# Patient Record
Sex: MALE | Race: BLACK OR AFRICAN AMERICAN | NOT HISPANIC OR LATINO | URBAN - METROPOLITAN AREA
[De-identification: names, ages, dates, MRNs, and addresses within clinical notes are randomized per-mention and may not be internally consistent; named-entity substitution may affect disease eponyms.]

---

## 2017-01-19 ENCOUNTER — EMERGENCY (EMERGENCY)
Facility: HOSPITAL | Age: 32
LOS: 1 days | Discharge: HOME | End: 2017-01-19
Attending: EMERGENCY MEDICINE | Admitting: EMERGENCY MEDICINE
Payer: COMMERCIAL

## 2017-01-19 VITALS
HEART RATE: 55 BPM | SYSTOLIC BLOOD PRESSURE: 132 MMHG | RESPIRATION RATE: 18 BRPM | DIASTOLIC BLOOD PRESSURE: 71 MMHG | OXYGEN SATURATION: 99 % | TEMPERATURE: 99 F

## 2017-01-19 VITALS
OXYGEN SATURATION: 100 % | SYSTOLIC BLOOD PRESSURE: 120 MMHG | DIASTOLIC BLOOD PRESSURE: 76 MMHG | HEART RATE: 55 BPM | TEMPERATURE: 98 F | RESPIRATION RATE: 16 BRPM

## 2017-01-19 DIAGNOSIS — L02.415 CUTANEOUS ABSCESS OF RIGHT LOWER LIMB: ICD-10-CM

## 2017-01-19 LAB — HIV 1 & 2 AB SERPL IA.RAPID: SIGNIFICANT CHANGE UP

## 2017-01-19 PROCEDURE — 76881 US COMPL JOINT R-T W/IMG: CPT | Mod: 26,RT

## 2017-01-19 PROCEDURE — 99284 EMERGENCY DEPT VISIT MOD MDM: CPT | Mod: 25

## 2017-01-19 PROCEDURE — 10061 I&D ABSCESS COMP/MULTIPLE: CPT

## 2017-01-19 PROCEDURE — 76882 US LMTD JT/FCL EVL NVASC XTR: CPT | Mod: 26,RT

## 2017-01-19 RX ADMIN — Medication 100 MILLIGRAM(S): at 23:55

## 2017-01-19 NOTE — ED PROVIDER NOTE - PHYSICAL EXAMINATION
fluctuant 4cm diameter mass in R groin, with overlying erythema and tenderness. fluctuant 4cm diameter mass in R groin, with overlying erythema and tenderness.    ATTENDING MD Annette: GEN: mild distress from pain, nontoic  CV: RRR  LUNGS: CTA  ABD: soft, notnender  : uncircumcised penis, no lesions, no distcharge, normal testicles. R-medial/subinguinal thigh with 4x2cm fluctuant mass with groove sign int he middle, feels like 2 discreet papable masses with fluctuance, ?abscess vs. buboe  SKIN: otherwise unremarkable

## 2017-01-19 NOTE — ED ADULT NURSE NOTE - OBJECTIVE STATEMENT
32 year old male presented to the ED complaining of a bump on the inside of his right upper thigh near the groin. Pt states it began a few weeks ago, has gotten larger and more painful. Pt went to UC 4 days ago and was sent home with topical antibiotic ointment. Pt denies fever, chills, NVD, denies any drainage from site.

## 2017-01-19 NOTE — ED PROVIDER NOTE - ATTENDING CONTRIBUTION TO CARE
ATTENDING MD Annette: I have seen and evaluated this patient with the advance practice clinician.  I have discussed the history, exam ,and aspects of care with the ACP.  I have reviewed the ACP's note. I agree with the findings  unless other wise stated in the HPI, PE, MDM, and progress notes.

## 2017-01-19 NOTE — ED PROCEDURE NOTE - DETAILS:
ATTENDING, MD Annette:  I, Mingo Bryant, was available in the Emergency Department throughout the entire procedure and I was present during the key portions. I agree with the procedure as documented.
ATTENDING, MD Annette:  I, Mingo Bryant, was available in the Emergency Department throughout the entire procedure and I was present during the key portions. I agree with the procedure as documented.

## 2017-01-19 NOTE — ED PROCEDURE NOTE - PROCEDURE ADDITIONAL DETAILS
2 discrete fluid collections visualized overal lsize 2.cm long. the larger collection is 1.5cm, the smaller is 0.95 cm. THere is no color flow. The fluid is simple. Overall appearance is most suggestive to me of buboe, but sent to radiology for comprehensive confirmatory study.

## 2017-01-19 NOTE — ED PROVIDER NOTE - CARE PLAN
Principal Discharge DX:	Abscess  Instructions for follow-up, activity and diet:	1. Take doxycycline 1 tablet twice daily for 10 days   2. Take ibuprofen and tylenol as directed as needed for pain   3. Follow-up here, or with your primary care physician within 2 days for a wound check.   4. Return to the ER for any new or worsening symptoms.

## 2017-01-19 NOTE — ED PROCEDURE NOTE - CPROC ED INFORMED CONSENT1
Benefits, risks, and possible complications of procedure explained to patient/caregiver who verbalized understanding and gave verbal consent.
Benefits, risks, and possible complications of procedure explained to patient/caregiver who verbalized understanding and gave verbal consent.

## 2017-01-19 NOTE — ED PROVIDER NOTE - PROGRESS NOTE DETAILS
us positive for groin abscess. I&D performed 5cc pus drained from area. will d/c with doxycyline and advise to have packing removed after 2 days and have wound check. -Katia Verdin PA-C us more suggestive for cutaneous abscess. I&D performed 5cc pus drained from area. will d/c with doxycyline and advise to have packing removed after 2 days and have wound check. -Katia Verdin PA-C

## 2017-01-19 NOTE — ED PROVIDER NOTE - PLAN OF CARE
1. Take doxycycline 1 tablet twice daily for 10 days   2. Take ibuprofen and tylenol as directed as needed for pain   3. Follow-up here, or with your primary care physician within 2 days for a wound check.   4. Return to the ER for any new or worsening symptoms.

## 2017-01-19 NOTE — ED PROVIDER NOTE - MEDICAL DECISION MAKING DETAILS
33 y/o M no pmhx presenting with R groin fluctuant mass, varying in size for 1 week, reportedly larger and more tender today causing him to come in. PE remarkable for tender, 4cm fluctuant mass in area. Pt denies any promiscuity outside of his marriage. Denies any fever or chills. Will obtain bloodwork and us of area to determine abscess vs gil and reassess. 31 y/o M no pmhx presenting with R groin fluctuant mass, varying in size for 1 week, reportedly larger and more tender today causing him to come in. PE remarkable for tender, 4cm fluctuant mass in area. Pt denies any promiscuity outside of his marriage. Denies any fever or chills. Will obtain bloodwork and us of area to determine abscess vs buboe and reassess.    ATTENDING MD Annette: Pt with groin swelling, +groove sign, but atypical location for LGA. will get US to determine if abscess (which should be I&Ded) vs. buboe (which is not). Will start doxycycline for coverage of both. Pt requesting rapid HIV and sypphilis. Will send gen-probe for chlamydia.

## 2017-01-19 NOTE — ED PROVIDER NOTE - OBJECTIVE STATEMENT
33 y/o M no pmhx presenting with swelling and pain to R groin, coming and going for 1 week. Reports initially noted a small, round area of swelling, for which he went to urgent care for evaluation. He was told it was 'likely a lymph node and to watch it.' He reports that today it became significantly larger and more painful so he came in. He denies any fevers, chills. Denies ever having anything like this before. Reports he is sexually active with one partner in his wife and denies any sexual activity outside of his marriage. Denies any history of STD.

## 2017-01-20 LAB
C TRACH RRNA SPEC QL NAA+PROBE: SIGNIFICANT CHANGE UP
GC AMPLIFICATION INTERPRETATION: SIGNIFICANT CHANGE UP
N GONORRHOEA RRNA SPEC QL NAA+PROBE: SIGNIFICANT CHANGE UP
SPECIMEN SOURCE: SIGNIFICANT CHANGE UP
SPECIMEN SOURCE: SIGNIFICANT CHANGE UP
T PALLIDUM AB TITR SER: NEGATIVE — SIGNIFICANT CHANGE UP

## 2017-01-22 LAB
CULTURE RESULTS: NO GROWTH — SIGNIFICANT CHANGE UP
SPECIMEN SOURCE: SIGNIFICANT CHANGE UP

## 2017-04-13 PROCEDURE — 76882 US LMTD JT/FCL EVL NVASC XTR: CPT

## 2017-04-13 PROCEDURE — 87591 N.GONORRHOEAE DNA AMP PROB: CPT

## 2017-04-13 PROCEDURE — 10061 I&D ABSCESS COMP/MULTIPLE: CPT

## 2017-04-13 PROCEDURE — 86703 HIV-1/HIV-2 1 RESULT ANTBDY: CPT

## 2017-04-13 PROCEDURE — 87070 CULTURE OTHR SPECIMN AEROBIC: CPT

## 2017-04-13 PROCEDURE — 86780 TREPONEMA PALLIDUM: CPT

## 2017-04-13 PROCEDURE — 76881 US COMPL JOINT R-T W/IMG: CPT

## 2017-04-13 PROCEDURE — 87491 CHLMYD TRACH DNA AMP PROBE: CPT

## 2017-04-13 PROCEDURE — 99284 EMERGENCY DEPT VISIT MOD MDM: CPT | Mod: 25

## 2017-10-30 NOTE — ED PROCEDURE NOTE - CPROC ED ANATOMIC LOCATION1
Medication refill received from patient     Adderall XR 30 mg    LOV: 9/8/17  NOV: none  Last Labs: none  Last refilled: 10/8/17.  Due 11/7/17    Please advise.      left

## 2018-07-06 ENCOUNTER — EMERGENCY (EMERGENCY)
Facility: HOSPITAL | Age: 33
LOS: 1 days | Discharge: HOME | End: 2018-07-06
Attending: EMERGENCY MEDICINE
Payer: COMMERCIAL

## 2018-07-06 VITALS
SYSTOLIC BLOOD PRESSURE: 129 MMHG | DIASTOLIC BLOOD PRESSURE: 71 MMHG | OXYGEN SATURATION: 100 % | WEIGHT: 160.06 LBS | TEMPERATURE: 98 F | HEART RATE: 63 BPM | RESPIRATION RATE: 18 BRPM

## 2018-07-06 PROCEDURE — 99283 EMERGENCY DEPT VISIT LOW MDM: CPT

## 2018-07-06 RX ORDER — LIDOCAINE 4 G/100G
1 CREAM TOPICAL ONCE
Qty: 0 | Refills: 0 | Status: COMPLETED | OUTPATIENT
Start: 2018-07-06 | End: 2018-07-06

## 2018-07-06 RX ORDER — IBUPROFEN 200 MG
600 TABLET ORAL ONCE
Qty: 0 | Refills: 0 | Status: COMPLETED | OUTPATIENT
Start: 2018-07-06 | End: 2018-07-06

## 2018-07-06 RX ORDER — ACETAMINOPHEN 500 MG
975 TABLET ORAL ONCE
Qty: 0 | Refills: 0 | Status: COMPLETED | OUTPATIENT
Start: 2018-07-06 | End: 2018-07-06

## 2018-07-06 RX ADMIN — Medication 600 MILLIGRAM(S): at 21:29

## 2018-07-06 RX ADMIN — LIDOCAINE 1 PATCH: 4 CREAM TOPICAL at 21:30

## 2018-07-06 RX ADMIN — Medication 975 MILLIGRAM(S): at 21:30
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
## 2018-07-06 NOTE — ED PROVIDER NOTE - CONSTITUTIONAL, MLM
normal... Well appearing, well nourished, awake, alert, oriented to person, place, time/situation and in no apparent distress. Able to walk into ED

## 2018-07-06 NOTE — ED PROVIDER NOTE - ATTENDING CONTRIBUTION TO CARE
Michaela Fsih MD - Attending Physician: I have personally seen and examined this patient with the resident/fellow.  I have fully participated in the care of this patient. I have reviewed all pertinent clinical information, including history, physical exam, plan and the Resident/Fellow’s note and agree except as noted. See MDM

## 2018-07-06 NOTE — ED ADULT NURSE NOTE - CHPI ED SYMPTOMS NEG
no headache/no fussiness/no disorientation/no crying/no decreased eating/drinking/no difficulty bearing weight/no bruising/no laceration/no dizziness
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
## 2018-07-06 NOTE — ED PROVIDER NOTE - MUSCULOSKELETAL, MLM
Limited ROm of R shoulder w/ abduction 2/2 to pain. Tenderness over R superior trapezius muscle. No AC tenderness. Full ROM of neck. No midline spinal tendenress. Paraspinal lumbar tenderness b/l.

## 2018-07-06 NOTE — ED PROVIDER NOTE - CARE PLAN
Principal Discharge DX:	Acute bilateral low back pain without sciatica  Assessment and plan of treatment:	1) Please return to the ED should you have any new or worsening symptoms, worsening pain, develop difficulty walking or any concerning symptoms  2) Please follow up with Please follow up with Orthopedics in 3-5 days. A list of local providers has been provided to you.   3) Please take Motrin (Ibuprofen) 600mg by mouth every 6 hours as needed for pain. Please take this medication with food.   4) Please take Tylenol (Acetaminophen) 650 mg every 4-6 hours as needed for pain. Please do not exceed more than 4,000mg of Tylenol in a day   5) Please remove lidoderm patch 12 hours after placement

## 2018-07-06 NOTE — ED PROVIDER NOTE - PLAN OF CARE
1) Please return to the ED should you have any new or worsening symptoms, worsening pain, develop difficulty walking or any concerning symptoms  2) Please follow up with Please follow up with Orthopedics in 3-5 days. A list of local providers has been provided to you.   3) Please take Motrin (Ibuprofen) 600mg by mouth every 6 hours as needed for pain. Please take this medication with food.   4) Please take Tylenol (Acetaminophen) 650 mg every 4-6 hours as needed for pain. Please do not exceed more than 4,000mg of Tylenol in a day   5) Please remove lidoderm patch 12 hours after placement

## 2019-03-21 NOTE — ED PROVIDER NOTE - CONSTITUTIONAL, MLM
"Encounter Date: 3/21/2019    SCRIBE #1 NOTE: I, Faizan Barnes, am scribing for, and in the presence of,  Yemi Fragoso MD. I have scribed the following portions of the note - the EKG reading. Other sections scribed: ROS, PE.       History     Chief Complaint   Patient presents with    Chest Pain     stabbing chest pain since last night, on and off today      37-year-old male presents with chest pain. Patient reports left-sided chest pain began last night after eating a [employer] cheese steak.  It was described as stabbing. It was constant throughout the night until some relief this morning with an antacid.  Approximately 2 hrs PTA, the pain returned.  Pain has been constant since that time. Left chest, no radiation. Currently "sticking" in quality. No shortness of breath, N/D, diaphoresis.  Patient has been complaining of a cough for the past 3-4 days, productive of green mucus. No history of tobacco abuse. No family history of early CAD. No medical problems.  No immobilization or recent travel.  No history of VTE.          Review of patient's allergies indicates:  No Known Allergies  History reviewed. No pertinent past medical history.  Past Surgical History:   Procedure Laterality Date    NONE N/A 11/8/2014     Family History   Problem Relation Age of Onset    Cancer Neg Hx     Diabetes Neg Hx     Heart disease Neg Hx     Hypertension Neg Hx     Stroke Neg Hx      Social History     Tobacco Use    Smoking status: Never Smoker    Smokeless tobacco: Never Used   Substance Use Topics    Alcohol use: No    Drug use: No     Review of Systems   Constitutional: Negative for chills and fever.   HENT: Negative for sore throat.    Respiratory: Positive for cough. Negative for shortness of breath.    Cardiovascular: Positive for chest pain.   Gastrointestinal: Negative for abdominal pain.   Genitourinary: Negative for frequency.   Musculoskeletal: Negative for myalgias.   Skin: Negative for rash.   Neurological: " Negative for syncope and weakness.   Hematological: Does not bruise/bleed easily.       Physical Exam     Initial Vitals [03/21/19 1811]   BP Pulse Resp Temp SpO2   (!) 142/76 99 18 98.1 °F (36.7 °C) 98 %      MAP       --         Physical Exam    Nursing note and vitals reviewed.  Constitutional: He appears well-developed and well-nourished. He is not diaphoretic. No distress.   HENT:   Head: Normocephalic and atraumatic.   Eyes: Conjunctivae and EOM are normal.   Neck: Normal range of motion. Neck supple. No JVD present.   Cardiovascular: Regular rhythm and intact distal pulses. Exam reveals no gallop and no friction rub.    No murmur heard.  Tachycardic heart sounds.   Pulmonary/Chest: Breath sounds normal. No respiratory distress. He has no wheezes. He has no rhonchi. He has no rales. He exhibits no tenderness.   No cough noted.   Abdominal: Soft. Bowel sounds are normal. He exhibits no distension and no mass. There is no tenderness. There is no rebound and no guarding.   Musculoskeletal: Normal range of motion. He exhibits no tenderness.   Lymphadenopathy:     He has no cervical adenopathy.   Neurological: He is alert and oriented to person, place, and time. He has normal strength. No sensory deficit.   Skin: Skin is warm and dry. Capillary refill takes less than 2 seconds.   Psychiatric: He has a normal mood and affect.         ED Course   Procedures  Labs Reviewed   COMPREHENSIVE METABOLIC PANEL - Abnormal; Notable for the following components:       Result Value    Glucose 126 (*)     All other components within normal limits    Narrative:     ONE LAVENDER SHARED   CBC W/ AUTO DIFFERENTIAL    Narrative:     ONE LAVENDER SHARED   TROPONIN I    Narrative:     ONE LAVENDER SHARED   D DIMER, QUANTITATIVE    Narrative:     ONE LAVENDER SHARED   B-TYPE NATRIURETIC PEPTIDE   TROPONIN I     EKG Readings: (Independently Interpreted)   Sinus tachycardia at 109 bpm, normal axis, normal ST segments, T wave inversions in  lead 3 and AVF.       Imaging Results          X-Ray Chest PA And Lateral (In process)               X-Rays:   Independently Interpreted Readings:   Chest X-Ray: Normal heart size.  No infiltrates.  No acute abnormalities.     Medical Decision Making:   History:   Old Medical Records: I decided to obtain old medical records.  Initial Assessment:   37-year-old male presents with chest pain.  Differential Diagnosis:   This patient's differential diagnosis includes, but is not limited to: acute myocardial infarction (AMI), acute coronary syndrome, pneumothorax, pleurisy, pneumonia,costochondritis, pulmonary embolus, muscular pain, reflux, diaphragmatic irritation.      Clinical Tests:   Lab Tests: Ordered and Reviewed  Radiological Study: Reviewed and Ordered  Medical Tests: Reviewed and Ordered  ED Management:  Pt's well appearance and reassuring hemodynamics are inconsistent with aortic dissection.  Given the concurrence of the symptoms with a meal, and improvement with an antacid, they are overall concerning for gastritis.  However will investigate for cardiac etiology.  Will obtain serum labs and chest x-ray.  Will administer aspirin and GI cocktail.    Reassessment:  Chest x-ray without acute process.  CBC and CMP normal. Troponin <0.006. D-dimer 0.19.  Repeat vital signs improved. On repeat assessment, pt reports pain resolved with GI cocktail. At this time, my shift is coming to a close, and the patient was signed out to incoming MD. If 2nd trop negative, anticipate discharge with H2 blockers.      Additional MDM:     Well's Criteria Score:  -Clinical symptoms of DVT (leg swelling, pain with palpation) = 0.0  -Other diagnosis less likely than pulmonary embolism =            0.0  -Heart Rate >100 =   1.5  -Immobilization (= or > than 3 days) or surgery in the previous 4 weeks = 0.0  -Previous DVT/PE = 0.0  -Hemoptysis =          0.0  -Malignancy =           0.0  Well's Probability Score =    1.5      Heart Score:     History:          Slightly suspicious.  ECG:             Normal  Age:               Less than 45 years  Risk factors: no risk factors known  Troponin:       Less than or equal to normal limit  Final Score: 0               Attending Attestation:           Physician Attestation for Scribe:      Comments: I, Dr. Yemi Fragoso, personally performed the services described in this documentation. All medical record entries made by the scribe were at my direction and in my presence.  I have reviewed the chart and agree that the record reflects my personal performance and is accurate and complete. Yemi Fragoso MD.  7:34 PM 03/21/2019                 Clinical Impression:       ICD-10-CM ICD-9-CM   1. Chest pain R07.9 786.50                                Yemi Fragoso MD  03/21/19 2016     normal... Well appearing, well nourished, awake, alert, oriented to person, place, time/situation and in no apparent distress.

## 2019-10-25 ENCOUNTER — EMERGENCY (EMERGENCY)
Facility: HOSPITAL | Age: 34
LOS: 1 days | Discharge: HOME | End: 2019-10-25
Attending: EMERGENCY MEDICINE
Payer: COMMERCIAL

## 2019-10-25 VITALS
WEIGHT: 160.06 LBS | HEART RATE: 59 BPM | DIASTOLIC BLOOD PRESSURE: 70 MMHG | OXYGEN SATURATION: 100 % | SYSTOLIC BLOOD PRESSURE: 107 MMHG | RESPIRATION RATE: 18 BRPM | HEIGHT: 65 IN | TEMPERATURE: 98 F

## 2019-10-25 PROCEDURE — 72125 CT NECK SPINE W/O DYE: CPT | Mod: 26

## 2019-10-25 PROCEDURE — 93005 ELECTROCARDIOGRAM TRACING: CPT | Mod: 76

## 2019-10-25 PROCEDURE — 93010 ELECTROCARDIOGRAM REPORT: CPT

## 2019-10-25 PROCEDURE — 72125 CT NECK SPINE W/O DYE: CPT

## 2019-10-25 PROCEDURE — 99284 EMERGENCY DEPT VISIT MOD MDM: CPT | Mod: 25

## 2019-10-25 PROCEDURE — 71046 X-RAY EXAM CHEST 2 VIEWS: CPT

## 2019-10-25 PROCEDURE — 99284 EMERGENCY DEPT VISIT MOD MDM: CPT

## 2019-10-25 PROCEDURE — 71046 X-RAY EXAM CHEST 2 VIEWS: CPT | Mod: 26

## 2019-10-25 RX ORDER — ACETAMINOPHEN 500 MG
975 TABLET ORAL ONCE
Refills: 0 | Status: COMPLETED | OUTPATIENT
Start: 2019-10-25 | End: 2019-10-25

## 2019-10-25 RX ORDER — IBUPROFEN 200 MG
400 TABLET ORAL ONCE
Refills: 0 | Status: COMPLETED | OUTPATIENT
Start: 2019-10-25 | End: 2019-10-25

## 2019-10-25 RX ORDER — LIDOCAINE 4 G/100G
2 CREAM TOPICAL ONCE
Refills: 0 | Status: COMPLETED | OUTPATIENT
Start: 2019-10-25 | End: 2019-10-25

## 2019-10-25 RX ADMIN — Medication 975 MILLIGRAM(S): at 10:43

## 2019-10-25 RX ADMIN — LIDOCAINE 2 PATCH: 4 CREAM TOPICAL at 10:43

## 2019-10-25 RX ADMIN — Medication 400 MILLIGRAM(S): at 09:50
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
## 2019-10-25 NOTE — ED PROVIDER NOTE - NSFOLLOWUPCLINICS_GEN_ALL_ED_FT
[facility] - Primary Care  Primary Care  [address] [place] [address]  Phone: [phone]  Fax:   Follow Up Time: 4-6 Days

## 2019-10-25 NOTE — ED PROVIDER NOTE - NSFOLLOWUPINSTRUCTIONS_ED_ALL_ED_FT
Please return to the ED for any concerns. Please take tylenol 1000mg and ibuprofen 400mg every 6 hours as needed for pain. Please use salon-pas patches that are over the counter at pharmacies to apply to the back of your neck or on your lower back as directed as needed for pain.
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
## 2019-10-25 NOTE — ED ADULT NURSE NOTE - NSIMPLEMENTINTERV_GEN_ALL_ED
Implemented All Universal Safety Interventions:  [place] to call system. Call bell, personal items and telephone within reach. Instruct patient to call for assistance. Room bathroom lighting operational. Non-slip footwear when patient is off stretcher. Physically safe environment: no spills, clutter or unnecessary equipment. Stretcher in lowest position, wheels locked, appropriate side rails in place.
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
## 2019-10-25 NOTE — ED PROVIDER NOTE - PHYSICAL EXAMINATION
Slava CRISTOBAL MD PGY2:   PHYSICAL EXAM:    GENERAL: NAD, well-developed  HEENT:  Atraumatic, Normocephalic. Cervical paraverrtebral TTP > midline TTP>   CHEST/LUNG: Sternum TTP  HEART: Regular rate and rhythm  ABDOMEN: Soft, Nontender, Nondistended; Normoactive bowel sounds  EXTREMITIES:  2+ Peripheral Pulses.  PSYCH: A&Ox3  SKIN: No obvious rashes or lesions  MSK: LBP paraverberal TTP.   NEUROLOGY: ambulating well with strength and sensatiion intact in BLUE and BLLE.
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
## 2019-10-25 NOTE — ED PROVIDER NOTE - PATIENT PORTAL LINK FT
You can access the FollowMyHealth Patient Portal offered by [facility] by registering at the following website: [email or web address]. By joining [employer]’s FollowMyHealth portal, you will also be able to view your health information using other applications (apps) compatible with our system.

## 2020-07-08 NOTE — ED PROVIDER NOTE - GASTROINTESTINAL NEGATIVE STATEMENT, MLM
06-Jul-2020 05:54 no abdominal pain, no bloating, no constipation, no diarrhea, no nausea and no vomiting.
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
## 2023-01-06 NOTE — ED PROVIDER NOTE - CONDITION AT DISCHARGE:
Last Imaging  XR FOOT WEIGHTBEARING (3 VIEWS), LEFT (CPT=73630)  Narrative: PROCEDURE: XR FOOT WEIGHTBEARING (3 VIEWS), LEFT (CPT=73630)     COMPARISON: [facility], XR FOOT, COMPLETE (MIN 3 VIEWS), LEFT (CPT=73630), 8/29/2022, 4:41 PM.     INDICATIONS: Follow up left foot first metatarsal pain post-fall x6 weeks. TECHNIQUE: 3 views were obtained. FINDINGS:   BONES: No acute fracture dislocation. 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 Prominent Achilles insertion enthesophyte and dystrophic calcification distal Achilles insertion. SOFT TISSUES: Negative. No visible soft tissue swelling. EFFUSION: None visible. OTHER: Negative. Impression: CONCLUSION:   1. No acute fracture dislocation. 2. Mild talonavicular degeneration. 3. 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   No significant change           Dictated by (CST): Laura Gallardo MD on 10/12/2022 at 11:45 AM       Finalized by (CST): Laura Gallardo MD on 10/12/2022 at 11:47 AM               Future Appointments   Date Time Provider Nubia Santa   1/12/2023  8:00 AM [facility]   1/13/2023 11:30 AM Hamilton Wallace, MAAME EMG Vidya Cordial [id number]   2/9/2023  8:30 AM Kay Webster MD [facility]
Patient is coming in for left foot tingling and numbness. Patient had imaging done,Imaging can be viewed in Epic. Please review imaging, and if further imaging is needed please place Rx .   Future Appointments   Date Time Provider Nubia Santa   1/12/2023  8:00 AM [facility] ALLERGY ECSCHAABDOULGY JUDY Barker   1/13/2023 11:30 AM Georgi Pulliam, DPM EMG Lolita Hernandez [id number]   2/9/2023  8:30 AM Jessika Darling MD [facility]
Satisfactory

## 2023-03-25 NOTE — ED ADULT TRIAGE NOTE - NS ED NURSE BANDS TYPE
[facility] Hospitalists Progress Note       Date Of Service: 03/25/23    Subjective:   No new complaints.  In bed eating junk food.  Verbalizes no new complaints.     Medical Decision Making:   Disposition:  Patient with multiple morbidities, continue with hospitalization.    Discussion and reviews:  Notes from last 24hrs reviewed and appreciated   Results of lab work and imaging studies from last 24hrs independently reviewed  Work up to continue as per orders and plan of care  Plan of care discussed with consultants as documented.    Today's updates and plan of care changes:  3/25:INR noted.  Likely therapeutic in 24 hours.  Needs delivery of taladafil and selexipag.    3/24:Plan to remove swan.  C/w coumadin/heparin bridge, INR 1.7.  Will transfer out to telemetry.  Increase activity.  Discharge when INR therapeutic and when both meds are received, tadalafil and selexipag.    3/23:C/w diuresis and started on digoxin.  Plan is to switch from Remodulin to Selexipag.  C/w Tadalafil.  INR 1.4 so c/w heparin/coumadin bridge.  INR goal: 2.5 to 3.5. Increase activity.  Blood sugars noted, continue to monitor.  A1C 5.6, 1/21/2023.  3/22:Plan is to not go home on Remodulin.  Tadalafil increased. Noted to be in negative balance.  INR subtherapeutic so c/w heparin/coumadin bridge.  Latest CXR report noted.    3/21:Switched from lasix IV to po torsemide.  Monitor urine output.  CXR report reviewed.  Add IS at bedside.  C/w  Remodulin.  INR noted, c/w heparin/coumadin bridge.  Follow up with rheumatology outpatient.    63 y/o F w/ CHFpEF (EF 54%), s/p mechanical MVR, severe pulmonary HTN, COPD with chronic hypoxic respiratory failure (4L at rest, 6L with acitivity) who presented with dyspnea and chest tightness.      # Severe pulmonary HTN, Group TBD likely mixed: WHO Group 1 h/o of RA, possible Sjogren's vs overlap; WHO Group 2 h/o afib, HTN and MVR; WHO Group 3 COPD +ILD.  # Cardiogenic shock due to fulminant RV failure:  #  CHFpEF w/ mild acute exacerbation:  # Chronic hypoxic respiratory failure: Uses 4L at rest, 6L w/ activity at home.  -- cardiology, pulmonary HTN, rheumatology, pulmonology consults  -- labs: DEAN screen positive but negative titer, +SSA, +RF, + Mitochondrial Ab, +CCP ab, HIV negative, viral hepatitis panel negative; TB negative  -- TTE 2/26: mild LVH, EF 54%, mild LA dilation, severe RV dilation w/ normal RV systolic function, severe RA dilation, severe TR, severe pulmonary HTN likely underestimated  -- VQ scan 2/28: no e/o PE  -- CT chest hi-resolution 3/3: basal predominant pulmonary fibrosis and bronchiectasis, mild to moderate background centrilobular emphysema, superimposed mild interstitial edema, moderate cardiomegaly with right atrial pressure predominance, enlargement of main PA-- consistent with UIP or Fibrotic NSIP  -- RHC 3/7: elevated RH filling pressure, elevated wedge, PAH moderate to severe, reduced CI  -- RHC 3/20: PA 64/19/36, PCWP 20, TPG 19, [place] 4.4/2.56, TD 3.5/2.05  -- [place] placed 3/7-3/10, 3/20-present to be managed by pHTN service  -- diuretic: Lasix gtt 3/7- 3/10; currently on TID IV Lasix and holding PO torsemide  -- prostacyclin: Remodulin 3/7- now; plan to continue at home.  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 Pylori:  -- GI consult  -- viral hepatitis panel negative  -- anti-mitochondrial AB strongly positive 104.2 (2/26)  -- U/S abdomen 2/23 (OSH): normal liver appearance, nonspecific gallbladder  wall thickening  -- HIDA scan 2/25: patent cystic duct, no e/o acute cholecystitis  -- MRCP 2/25: normal caliber of biliary tree, mild multichamber cardiomegaly and prominence of intrahepatic IVC and hepatic veins, mild anasarca  -- EGD 3/4: normal esophagus and duodenum, mild patchy edematous and erythematous mucosa in antrum (biopsied)  -- EGD pathology 3/4: chronic inactive H. pylori antral gastritis with intestinal metaplasia, negative for dysplasia; H. pylori immunostain is focally positive  -- may also need eventual liver biopsy for confirmation and staging of PBC  -- started ursodiol and fenofibrate 2/28 for PBC, discontinued 3/8     # Suspected Sjogrens:  # Reported h/o RA/overloap syndrome:  -- rheumatology consult  -- labs: +DEAN w/ negative titer, +SSA, +RF, +AMA  -- consideration of immunosuppression w/ steroids and possible steroid sparing agents per rheumatology possibly as an outpatient     # Anemia of chronic disease:  # Thrombocytopenia: Possibly related to Remodulin?  # Warfarin-induced coagulopathy; warfarin toxicity: May also be a role for hepatic dysfunction contributing to INR elervation.  -- hematology consult  -- s/p vitamin K  -- will give FFP or KCentra if active bleeding develops  --  PF4 Negative     # Mechanical MVR:  # Persistent atrial fibrillation s/p ablation 2/2018:  # Transisent nonsustained idioventricular rhythm:  -- cardiology consult  -- seen by [facility] EP at OSH prior to transfer  -- continuing warfarin (except when holding prior to procedure), bridge w/ heparin gtt if INR not at goal  -- continue daily digoxin, metoprolol     # Elevated Ammonia: Lactulose 3/8-3/12, no clear evidence of encephalopathy. Holding Miralax.  # KAREEN on CKD stage 3a c/b hyperkalemia: Resolved.  Nephrology consulted. Baseline Cr 0.9-1.1. Treated medically for hyperK w/ Lokelma 3/1  # Elevated D-dimer: U/S LE venous dopplers 2/27-- no DVT. VQ scan 2/28-no e/o PE.  # Headache; blurred vision: Resolved. CT  Name band; head 3/10- No acute intracranial hemorrhage, acute transcortical infarct, or mass effect.  # COPD without acute exacerbation; ruled-out acute hypoxic respiratory failure; TAMIKO: Continue home Trelegy, PRN albuterol. Refuses CPAP.  # GERD: Continue pantoprazole.   # Obesity:  on diet and exercise with goal of weight loss.         Patient Active Problem List   Diagnosis   • Syncope and collapse   • Unstable angina (CMD)   • Hyperkalemia   • Pulmonary hypertension (CMD)       DVT ppx:   heparin (porcine) - 25,000 units/250 mL in dextrose 5 %  warfarin - 4 MG  WARFARIN - PHARMACIST MONITORED Misc     Recent vitals:  Visit Vitals  BP 93/60 (BP Location: LUE - Left upper extremity, Patient Position: Sitting)   Pulse 94   Temp 97.9 °F (36.6 °C) (Oral)   Resp 18   Ht 5' 5\" (1.651 m)   Wt 69.1 kg (152 lb 6.4 oz)   LMP  (LMP Unknown)   SpO2 96%   BMI 25.36 kg/m²       Labs   Recent Labs   Lab 03/25/23 0442 03/24/23 0343 03/23/23  1159 03/23/23 0433 03/22/23 2042   SODIUM 135 135  --  132*  --    POTASSIUM 3.6 3.5 4.4 3.7 3.6   CHLORIDE 99 100  --  99  --    CO2 33* 32  --  30  --    BUN 13 12  --  8  --    CREATININE 0.77 0.79  --  0.71  --    GLUCOSE 77 78  --  91  --    CALCIUM 8.6 8.7  --  8.5  --    ALBUMIN 2.3* 2.2*  --  2.3*  --    AST 60* 48*  --  54*  --    MG  --  1.8  --  2.0 2.1   PHOS  --   --   --  3.8  --      Recent Labs   Lab 03/25/23 0442 03/24/23 0343 03/23/23 0433   WBC 5.5 5.8 5.5   HGB 10.9* 11.3* 11.2*   HCT 33.6* 33.8* 34.3*    270 257       Recent Labs   Lab 03/25/23 0442 03/24/23 0343 03/23/23 0433   INR 2.3 1.7 1.4      No results available in last 24 hours     Cultures  Microbiology Results     None           Objective:   Head and neck:atraumatic/nc.  R swan noted.  Lungs: There is no wheezing or crackles.   Heart: no M.   Abd: Soft, nontender, non distended.    Skin: Warm and dry.  Neuro: alert and responds appropriately  Extremities;no edema    Current Medications:  Current  Facility-Administered Medications   Medication Dose Route Frequency Provider Last Rate Last Admin   • warfarin (COUMADIN) tablet 4 mg  4 mg Oral Once Izabela Acevedo MD       • metoPROLOL succinate (TOPROL-XL) ER tablet 25 mg  25 mg Oral QHS Francisco Coffey MD       • selexipag (UPTRAVI) tablet 400 mcg  400 mcg Oral 2 times per day Mary Anne ANGIE Hernandez, CNP   400 mcg at 03/25/23 0824   • tadalafil (CIALIS) tablet 40 mg  40 mg Oral Daily Mary Anne ANGIE Hernandez, CNP   40 mg at 03/25/23 0824   • torsemide (DEMADEX) tablet 40 mg  40 mg Oral BID BYRON Stafford   40 mg at 03/25/23 0825   • sodium chloride (PF) 0.9 % injection 2 mL  2 mL Intracatheter 2 times per day Dru Chen MD   2 mL at 03/25/23 0826   • WARFARIN - PHARMACIST MONITORED Misc   Does not apply See Admin Instructions Izabela Acevedo MD       • digoxin (LANOXIN) tablet 125 mcg  125 mcg Oral Daily Richelle Helton CNP   125 mcg at 03/25/23 0825   • Potassium Standard Replacement Protocol (Levels 3.5 and lower)   Does not apply See Admin Instructions Michele King MD       • Phosphorus Standard Replacement Protocol   Does not apply See Admin Instructions Michele King MD       • Potassium Replacement (Levels 3.6 - 4)   Does not apply See Admin Instructions Michele King MD       • sodium chloride (PF) 0.9 % injection 10 mL  10 mL Injection 2 times per day Izabela Acevedo MD   10 mL at 03/25/23 0825   • pantoprazole (PROTONIX) EC tablet 40 mg  40 mg Oral QAM AC Izabela Acevedo MD   40 mg at 03/25/23 0608   • fluticasone-umeclidin-vilanterol (TRELEGY ELLIPTA) 100-62.5-25 MCG/ACT inhaler 1 puff  1 puff Inhalation Daily Resp Jasmyne Dickinson CNP   1 puff at 03/25/23 0829   • polyethylene glycol (MIRALAX) packet 17 g  17 g Oral Daily Jasmyne Dickinson CNP   17 g at 03/09/23 0822   • sodium chloride (PF) 0.9 % injection 2 mL  2 mL Intracatheter 2 times per day Jasmyne Dickinson, CNP   2 mL at 03/25/23 0800   • Magnesium Standard  Replacement Protocol   Does not apply See Admin Instructions Jasmyne Dickinson CNP           Continuous Infusions:  Current Facility-Administered Medications   Medication Dose Route Frequency Provider Last Rate Last Admin   • sodium chloride 0.9% infusion   Intravenous Continuous PRN Francisco Coffey MD       • heparin (porcine) 25,000 units/250 mL in dextrose 5 % infusion  1-30 Units/kg/hr (Order-Specific) Intravenous Continuous Francisco Javier Griffith MD 7.5 mL/hr at 03/25/23 0737 11 Units/kg/hr at 03/25/23 0737       PRN Meds:.  Current Facility-Administered Medications   Medication Dose Route Frequency   • sodium chloride 0.9% infusion   Intravenous Continuous PRN   • acetaminophen (TYLENOL) tablet 650 mg  650 mg Oral Q4H PRN    Or   • acetaminophen (TYLENOL) suppository 650 mg  650 mg Rectal Q4H PRN   • ondansetron (ZOFRAN ODT) disintegrating tablet 4 mg  4 mg Oral Q12H PRN    Or   • ondansetron (ZOFRAN) injection 4 mg  4 mg Intravenous Q12H PRN   • sodium chloride 0.9 % flush bag 25 mL  25 mL Intravenous PRN   • heparin (porcine) injection 2,000 Units  2,000 Units Intravenous PRN   • heparin (porcine) injection 4,000 Units  4,000 Units Intravenous PRN   • sodium chloride (PF) 0.9 % injection 10 mL  10 mL Injection PRN   • sodium chloride (PF) 0.9 % injection 20 mL  20 mL Injection PRN   • melatonin tablet 6 mg  6 mg Oral Nightly PRN   • polyethylene glycol (MIRALAX) packet 17 g  17 g Oral Daily PRN   • aluminum-magnesium hydroxide-simethicone (MAALOX) 200-200-20 MG/5ML suspension 30 mL  30 mL Oral Q4H PRN   • sodium chloride 0.9 % flush bag 25 mL  25 mL Intravenous PRN   • HYDROcodone-acetaminophen (NORCO) 5-325 MG per tablet 1 tablet  1 tablet Oral Q4H PRN            Code Status    Code Status: Full Resuscitation      I spent greater than 45 minutes coordinating care, reviewing patient's diagnostic studies, examining patient, reviewing pertinent notes, collaborating with RNs/physicians/APNs/PAs involved in  patient's care, determining management plan, and discussing plan with patient at bedside.       This note was created using voice recognition technology. It may include inadvertent transcriptional errors. Any such errors should be contextually interpreted and should not be taken to alter the content or the meaning.   Note to Patient: The 21st Century Cures Act makes medical notes like these available to patients in the interest of transparency. However, be advised this is a medical document. It is intended as peer to peer communication. It is written in medical language and may contain abbreviations or verbiage that are unfamiliar. It may appear blunt or direct. Medical documents are intended to carry relevant information, facts as evident, and the clinical opinion of the practitioner.

## 2024-04-03 NOTE — ED ADULT NURSE NOTE - RN DISCHARGE SIGNATURE
[TextEntry] :   GENERAL: Appears in no acute distress HEENT: EOMI. No conjunctival erythema. Moist mucous membranes. No nasopharyngeal ulcers NECK: Supple, no cervical lymphadenopathy CARDIOVASCULAR: RRR. S1, S2 auscultated.  PULMONARY: Clear to auscultation b/l,  MSK: No active synovitis, swelling, erythema, or warmth. No joint tenderness to palpation. No Bouchards or Heberdens nodes No deformities. Normal ROM of neck, back, b/l upper and lower extremities. Beighton score: 7 out of 9 (5th fingers b/l , elbows, knees, hyperextension, spine)  SKIN: No lesions or rashes No sclerodactyly, telangiectasias, calcinosis.  Toe redness/discoloration noted NEURO: No focal deficits. Motor strength 5/5 in major muscle groups of b/l UE and LE. Sensation to soft touch intact in major dermatomes of b/l UE and LE. PSYCH:  Normal affect and thought process.   
06-Jul-2018